# Patient Record
Sex: FEMALE | Race: WHITE | NOT HISPANIC OR LATINO | Employment: UNEMPLOYED | URBAN - METROPOLITAN AREA
[De-identification: names, ages, dates, MRNs, and addresses within clinical notes are randomized per-mention and may not be internally consistent; named-entity substitution may affect disease eponyms.]

---

## 2018-06-20 ENCOUNTER — OFFICE VISIT (OUTPATIENT)
Dept: OBSTETRICS AND GYNECOLOGY | Facility: CLINIC | Age: 63
End: 2018-06-20
Payer: COMMERCIAL

## 2018-06-20 VITALS — DIASTOLIC BLOOD PRESSURE: 68 MMHG | SYSTOLIC BLOOD PRESSURE: 116 MMHG | WEIGHT: 112 LBS

## 2018-06-20 DIAGNOSIS — Z12.31 SCREENING MAMMOGRAM, ENCOUNTER FOR: ICD-10-CM

## 2018-06-20 DIAGNOSIS — N95.0 POSTMENOPAUSAL BLEEDING: ICD-10-CM

## 2018-06-20 DIAGNOSIS — Z01.419 ENCOUNTER FOR GYNECOLOGICAL EXAMINATION (GENERAL) (ROUTINE) WITHOUT ABNORMAL FINDINGS: Primary | ICD-10-CM

## 2018-06-20 DIAGNOSIS — Z12.4 SCREENING FOR CERVICAL CANCER: ICD-10-CM

## 2018-06-20 DIAGNOSIS — D18.01 CAPILLARY HEMANGIOMA OF SKIN: ICD-10-CM

## 2018-06-20 PROCEDURE — 88175 CYTOPATH C/V AUTO FLUID REDO: CPT | Performed by: PATHOLOGY

## 2018-06-20 PROCEDURE — 88141 CYTOPATH C/V INTERPRET: CPT | Mod: ,,, | Performed by: PATHOLOGY

## 2018-06-20 PROCEDURE — 99386 PREV VISIT NEW AGE 40-64: CPT | Mod: S$GLB,,, | Performed by: OBSTETRICS & GYNECOLOGY

## 2018-06-20 PROCEDURE — 99999 PR PBB SHADOW E&M-NEW PATIENT-LVL II: CPT | Mod: PBBFAC,,, | Performed by: OBSTETRICS & GYNECOLOGY

## 2018-06-20 RX ORDER — CYCLOSPORINE 0.5 MG/ML
EMULSION OPHTHALMIC
COMMUNITY
Start: 2018-05-11

## 2018-06-20 RX ORDER — METOPROLOL TARTRATE 50 MG/1
TABLET ORAL
COMMUNITY
Start: 2018-05-31 | End: 2019-02-28

## 2018-06-20 NOTE — PROGRESS NOTES
Subjective:       Patient ID: Sophy Fajardo is a 62 y.o. female.    Chief Complaint:  Well Woman      History of Present Illness  HPI  Annual Exam-Postmenopausal  Patient presents for annual exam. The patient c/o vaginal bleeding a few days ago. The patient is sexually active--occas vaginal burning with intercourse; uses lubricant; libido ok;  doing well since prostate cancer (robotic prostatectomy). GYN screening history: last pap: was normal and patient does not recall when last pap was and last mammogram: approximate date 2018 and was normal. The patient has never been taking hormone replacement therapy.  Patient reports post-menopausal vaginal bleeding--noticed after bathing--noticed on mat on floor; had noticed one other time after intercourse ; . The patient wears seatbelts: yes. The patient participates in regular exercise: yes--walks every other day for 30 min; . Has the patient ever been transfused or tattooed?: no. The patient reports that there is not domestic violence in her life.    Caregiver for 2 sons with disabilities       GYN & OB History  Patient's last menstrual period was 2008 (approximate).   Date of Last Pap: No result found    OB History    Para Term  AB Living   5 5       5   SAB TAB Ectopic Multiple Live Births           5      # Outcome Date GA Lbr Martin/2nd Weight Sex Delivery Anes PTL Lv   5 Para      Vag-Spont   ALIX   4 Para      Vag-Spont   ALIX   3 Para      Vag-Spont   ALIX   2 Para      Vag-Spont   ALIX   1 Para      Vag-Spont   ALIX          Review of Systems  Review of Systems   Constitutional: Negative for activity change, appetite change, chills, diaphoresis, fatigue, fever and unexpected weight change.   HENT: Negative for mouth sores and tinnitus.    Eyes: Negative for discharge and visual disturbance.   Respiratory: Negative for cough, shortness of breath and wheezing.    Cardiovascular: Negative for chest pain, palpitations and leg swelling.    Gastrointestinal: Negative for abdominal pain, bloating, blood in stool, constipation, diarrhea, nausea and vomiting.   Endocrine: Negative for diabetes, hair loss, hot flashes, hyperthyroidism and hypothyroidism.   Genitourinary: Positive for dyspareunia. Negative for decreased libido, dysuria, flank pain, frequency, genital sores, hematuria, menorrhagia, menstrual problem, pelvic pain, urgency, vaginal bleeding, vaginal discharge, vaginal pain, dysmenorrhea, urinary incontinence, postcoital bleeding, postmenopausal bleeding and vaginal odor.   Musculoskeletal: Negative for back pain and myalgias.   Skin:  Negative for rash, no acne and hair changes.   Neurological: Negative for seizures, syncope, numbness and headaches.   Hematological: Negative for adenopathy. Does not bruise/bleed easily.   Psychiatric/Behavioral: Negative for depression and sleep disturbance. The patient is not nervous/anxious.    Breast: Negative for breast mass, breast pain, nipple discharge and skin changes          Objective:    Physical Exam:   Constitutional: She appears well-developed.     Eyes: Conjunctivae and EOM are normal. Pupils are equal, round, and reactive to light.    Neck: Normal range of motion. Neck supple.     Pulmonary/Chest: Effort normal. Right breast exhibits no mass, no nipple discharge, no skin change and no tenderness. Left breast exhibits no mass, no nipple discharge, no skin change and no tenderness. Breasts are symmetrical.        Abdominal: Soft.     Genitourinary: Rectum normal, vagina normal and uterus normal.       Pelvic exam was performed with patient supine. Cervix is normal. Right adnexum displays no mass and no tenderness. Left adnexum displays no mass and no tenderness. No erythema, bleeding, rectocele, cystocele or unspecified prolapse of vaginal walls in the vagina. No vaginal discharge found. Labial bartholins normal.  Genitourinary Comments: atrophic        Uterus Size: 6 cm   Musculoskeletal:  Normal range of motion.       Neurological: She is alert.    Skin: Skin is warm.    Psychiatric: She has a normal mood and affect.          Assessment:     Encounter Diagnoses   Name Primary?    Postmenopausal bleeding     Encounter for gynecological examination (general) (routine) without abnormal findings Yes    Screening for cervical cancer     Screening mammogram, encounter for     Capillary hemangioma of skin                Plan:      Continue annual well woman exam.  Pap today; Reviewed updated recommendations for pap smears (every 3 years) in low risk patients.   Recommend annual pelvic exams.  Reviewed recommendations for annual CBE.  sono to eval uterine lining (postmenopausal bleeding) ; aware if lining >4mm, will need emb  Current on mammo  If sono/pap negative; suspect bleeding may have been from vulvar capillary  hemangioma

## 2018-06-21 ENCOUNTER — TELEPHONE (OUTPATIENT)
Dept: OBSTETRICS AND GYNECOLOGY | Facility: CLINIC | Age: 63
End: 2018-06-21

## 2018-06-27 ENCOUNTER — TELEPHONE (OUTPATIENT)
Dept: OBSTETRICS AND GYNECOLOGY | Facility: CLINIC | Age: 63
End: 2018-06-27

## 2018-06-27 NOTE — TELEPHONE ENCOUNTER
Previous message in her chart was done in error. She was scheduled for a repeat pap smear due to unsatisfactory results. Pt accepts appointment and verbalizes understanding. JAMEL

## 2018-06-27 NOTE — TELEPHONE ENCOUNTER
Pt has been made aware of prolactin results; she will fast the night before her next scheduled lab draw. Pt verbalizes understanding. JAMEL

## 2018-07-19 ENCOUNTER — OFFICE VISIT (OUTPATIENT)
Dept: OBSTETRICS AND GYNECOLOGY | Facility: CLINIC | Age: 63
End: 2018-07-19
Payer: COMMERCIAL

## 2018-07-19 VITALS
HEIGHT: 62 IN | WEIGHT: 112.44 LBS | BODY MASS INDEX: 20.69 KG/M2 | SYSTOLIC BLOOD PRESSURE: 132 MMHG | DIASTOLIC BLOOD PRESSURE: 86 MMHG

## 2018-07-19 DIAGNOSIS — R87.615 UNSATISFACTORY CERVICAL CYTOLOGY SMEAR: Primary | ICD-10-CM

## 2018-07-19 PROCEDURE — 99499 UNLISTED E&M SERVICE: CPT | Mod: S$GLB,,, | Performed by: OBSTETRICS & GYNECOLOGY

## 2018-07-19 PROCEDURE — 88175 CYTOPATH C/V AUTO FLUID REDO: CPT

## 2018-07-19 PROCEDURE — 99999 PR PBB SHADOW E&M-EST. PATIENT-LVL III: CPT | Mod: PBBFAC,,, | Performed by: OBSTETRICS & GYNECOLOGY

## 2018-07-19 RX ORDER — LEVOTHYROXINE SODIUM 150 UG/1
TABLET ORAL
COMMUNITY
Start: 2018-07-17 | End: 2019-02-28

## 2018-07-19 NOTE — PROGRESS NOTES
Subjective:       Patient ID: Sophy Fajarod is a 62 y.o. female.    Chief Complaint:  Abnormal Pap Smear (repeat 18 pap)      History of Present Illness  HPI  here for f/u pap   Previous pap 2018--unsatisfactory    GYN & OB History  Patient's last menstrual period was 2008 (approximate).   Date of Last Pap: 2018    OB History    Para Term  AB Living   5 5       5   SAB TAB Ectopic Multiple Live Births           5      # Outcome Date GA Lbr Martin/2nd Weight Sex Delivery Anes PTL Lv   5 Para      Vag-Spont   ALIX   4 Para      Vag-Spont   ALIX   3 Para      Vag-Spont   ALIX   2 Para      Vag-Spont   ALIX   1 Para      Vag-Spont   ALIX          Review of Systems  Review of Systems   All other systems reviewed and are negative.          Objective:    Physical Exam:   Constitutional: She appears well-developed.     Eyes: Conjunctivae and EOM are normal. Pupils are equal, round, and reactive to light.    Neck: Normal range of motion. Neck supple.     Pulmonary/Chest: Effort normal. Right breast exhibits no mass, no nipple discharge, no skin change, no tenderness and presence. Left breast exhibits no mass, no nipple discharge, no skin change, no tenderness and presence. Breasts are symmetrical.        Abdominal: Soft.     Genitourinary: Vagina normal and uterus normal. Pelvic exam was performed with patient supine.   Genitourinary Comments: atrophic           Musculoskeletal: Normal range of motion.       Neurological: She is alert.    Skin: Skin is warm.    Psychiatric: She has a normal mood and affect.          Assessment:      No diagnosis found.           Plan:      Continue annual well woman exam.

## 2019-02-28 ENCOUNTER — OFFICE VISIT (OUTPATIENT)
Dept: OBSTETRICS AND GYNECOLOGY | Facility: CLINIC | Age: 64
End: 2019-02-28
Payer: COMMERCIAL

## 2019-02-28 DIAGNOSIS — N95.0 POSTMENOPAUSAL BLEEDING: Primary | ICD-10-CM

## 2019-02-28 PROCEDURE — 99999 PR PBB SHADOW E&M-EST. PATIENT-LVL I: CPT | Mod: PBBFAC,,, | Performed by: OBSTETRICS & GYNECOLOGY

## 2019-02-28 PROCEDURE — 99213 PR OFFICE/OUTPT VISIT, EST, LEVL III, 20-29 MIN: ICD-10-PCS | Mod: S$GLB,,, | Performed by: OBSTETRICS & GYNECOLOGY

## 2019-02-28 PROCEDURE — 99213 OFFICE O/P EST LOW 20 MIN: CPT | Mod: S$GLB,,, | Performed by: OBSTETRICS & GYNECOLOGY

## 2019-02-28 PROCEDURE — 99999 PR PBB SHADOW E&M-EST. PATIENT-LVL I: ICD-10-PCS | Mod: PBBFAC,,, | Performed by: OBSTETRICS & GYNECOLOGY

## 2019-02-28 RX ORDER — METOPROLOL TARTRATE 50 MG/1
TABLET ORAL
COMMUNITY

## 2019-02-28 RX ORDER — LEVOTHYROXINE SODIUM 150 UG/1
TABLET ORAL
COMMUNITY
End: 2019-02-28

## 2019-02-28 NOTE — PROGRESS NOTES
Subjective:       Patient ID: Sophy Fajardo is a 63 y.o. female.    Chief Complaint:  Post menopausal bleeding      History of Present Illness  HPI  here for problem   Reports having another episode of vaginal bleeding that lasted 1 day    Hx of similar problem 2018; pap negative;   Previously aware of need for emb; pt elected to do pelvic sono --but canceled appt    Questions wether bleeding could be from fibroids    GYN & OB History  Patient's last menstrual period was 2008 (approximate).   Date of Last Pap: 2018    OB History    Para Term  AB Living   5 5       5   SAB TAB Ectopic Multiple Live Births           5      # Outcome Date GA Lbr Martin/2nd Weight Sex Delivery Anes PTL Lv   5 Para      Vag-Spont   ALIX   4 Para      Vag-Spont   ALIX   3 Para      Vag-Spont   ALIX   2 Para      Vag-Spont   ALIX   1 Para      Vag-Spont   ALIX          Review of Systems  Review of Systems   Genitourinary: Positive for postmenopausal bleeding.   All other systems reviewed and are negative.          Objective:      Physical Exam:   Constitutional: She appears well-developed.     Eyes: Conjunctivae and EOM are normal. Pupils are equal, round, and reactive to light.    Neck: Normal range of motion. Neck supple.     Pulmonary/Chest: Effort normal.        Abdominal: Soft.             Musculoskeletal: Normal range of motion.       Neurological: She is alert.    Skin: Skin is warm.    Psychiatric: She has a normal mood and affect.           Assessment:        1. Postmenopausal bleeding               Plan:      Emphasized need for emb  Declines emb today  Pt prefers pelvic sono  Pt aware if endometrial lining >4mm; needs emb or d&c  Pt voiced understanding

## 2019-03-06 ENCOUNTER — OFFICE VISIT (OUTPATIENT)
Dept: OBSTETRICS AND GYNECOLOGY | Facility: CLINIC | Age: 64
End: 2019-03-06
Payer: COMMERCIAL

## 2019-03-06 ENCOUNTER — PROCEDURE VISIT (OUTPATIENT)
Dept: OBSTETRICS AND GYNECOLOGY | Facility: CLINIC | Age: 64
End: 2019-03-06
Payer: COMMERCIAL

## 2019-03-06 VITALS
DIASTOLIC BLOOD PRESSURE: 84 MMHG | BODY MASS INDEX: 20.48 KG/M2 | HEIGHT: 62 IN | SYSTOLIC BLOOD PRESSURE: 152 MMHG | WEIGHT: 111.31 LBS

## 2019-03-06 DIAGNOSIS — N95.0 POSTMENOPAUSAL BLEEDING: ICD-10-CM

## 2019-03-06 DIAGNOSIS — N95.0 POSTMENOPAUSAL BLEEDING: Primary | ICD-10-CM

## 2019-03-06 PROCEDURE — 76856 PR  ECHO,PELVIC (NONOBSTETRIC): ICD-10-PCS | Mod: S$GLB,,, | Performed by: OBSTETRICS & GYNECOLOGY

## 2019-03-06 PROCEDURE — 99213 PR OFFICE/OUTPT VISIT, EST, LEVL III, 20-29 MIN: ICD-10-PCS | Mod: 25,S$GLB,, | Performed by: OBSTETRICS & GYNECOLOGY

## 2019-03-06 PROCEDURE — 99999 PR PBB SHADOW E&M-EST. PATIENT-LVL III: CPT | Mod: PBBFAC,,, | Performed by: OBSTETRICS & GYNECOLOGY

## 2019-03-06 PROCEDURE — 76856 US EXAM PELVIC COMPLETE: CPT | Mod: S$GLB,,, | Performed by: OBSTETRICS & GYNECOLOGY

## 2019-03-06 PROCEDURE — 99213 OFFICE O/P EST LOW 20 MIN: CPT | Mod: 25,S$GLB,, | Performed by: OBSTETRICS & GYNECOLOGY

## 2019-03-06 PROCEDURE — 99999 PR PBB SHADOW E&M-EST. PATIENT-LVL III: ICD-10-PCS | Mod: PBBFAC,,, | Performed by: OBSTETRICS & GYNECOLOGY

## 2019-03-06 NOTE — PROGRESS NOTES
Subjective:       Patient ID: Sophy Fajardo is a 63 y.o. female.    Chief Complaint:  Postmenopausal bleeding      History of Present Illness  HPI  here for follow up   Had sono for postmenopausal bleeding    sono findings reviewed--ut 6x4x7 with lining 4 mm; 2 intramural fibroids  Rt/lt ov not visualized;         GYN & OB History  Patient's last menstrual period was 2008 (approximate).   Date of Last Pap: 2018    OB History    Para Term  AB Living   5 5       5   SAB TAB Ectopic Multiple Live Births           5      # Outcome Date GA Lbr Martin/2nd Weight Sex Delivery Anes PTL Lv   5 Para      Vag-Spont   ALIX   4 Para      Vag-Spont   ALIX   3 Para      Vag-Spont   ALIX   2 Para      Vag-Spont   ALIX   1 Para      Vag-Spont   ALIX          Review of Systems  Review of Systems   Genitourinary: Positive for postmenopausal bleeding.   All other systems reviewed and are negative.          Objective:      Physical Exam:   Constitutional: She appears well-developed.     Eyes: Conjunctivae and EOM are normal. Pupils are equal, round, and reactive to light.    Neck: Normal range of motion. Neck supple.     Pulmonary/Chest: Effort normal.        Abdominal: Soft.             Musculoskeletal: Normal range of motion.       Neurological: She is alert.    Skin: Skin is warm.    Psychiatric: She has a normal mood and affect.           Assessment:     Encounter Diagnosis   Name Primary?    Postmenopausal bleeding Yes             Plan:   sono report reviewed with patient  Stress to patient the importance of sampling tissue to r/o diagnosis of endometrial cancer  Pt offered office emb vs hysteroscopy with d&c/myosure  Pt agrees to proceed with emb  Reviewed need to premedicate with nsaid prior to appt

## 2019-03-18 ENCOUNTER — PROCEDURE VISIT (OUTPATIENT)
Dept: OBSTETRICS AND GYNECOLOGY | Facility: CLINIC | Age: 64
End: 2019-03-18
Payer: COMMERCIAL

## 2019-03-18 VITALS
WEIGHT: 110.25 LBS | BODY MASS INDEX: 20.29 KG/M2 | SYSTOLIC BLOOD PRESSURE: 180 MMHG | DIASTOLIC BLOOD PRESSURE: 102 MMHG | HEIGHT: 62 IN

## 2019-03-18 DIAGNOSIS — N95.0 POSTMENOPAUSAL BLEEDING: Primary | ICD-10-CM

## 2019-03-18 LAB
B-HCG UR QL: NEGATIVE
CTP QC/QA: YES

## 2019-03-18 PROCEDURE — 88305 TISSUE EXAM BY PATHOLOGIST: CPT | Mod: 26,,, | Performed by: PATHOLOGY

## 2019-03-18 PROCEDURE — 81025 URINE PREGNANCY TEST: CPT | Mod: S$GLB,,, | Performed by: OBSTETRICS & GYNECOLOGY

## 2019-03-18 PROCEDURE — 81025 POCT URINE PREGNANCY: ICD-10-PCS | Mod: S$GLB,,, | Performed by: OBSTETRICS & GYNECOLOGY

## 2019-03-18 PROCEDURE — 88305 TISSUE EXAM BY PATHOLOGIST: CPT | Performed by: PATHOLOGY

## 2019-03-18 PROCEDURE — 88305 TISSUE SPECIMEN TO PATHOLOGY, OBSTETRICS/GYNECOLOGY: ICD-10-PCS | Mod: 26,,, | Performed by: PATHOLOGY

## 2019-03-18 NOTE — PROCEDURES
Procedures     CC: ENDOMETRIAL BIOPSPY    Sophy Fajardo is a 63 y.o. female  presents for an endometrial biopsy secondary to postmenopausal bleeding and thickened endometrial lining on sono.  UPT is Negative.      PRE-ENDOMETRIAL BIOPSY COUNSELING:    The patient was informed of the risk of bleeding, infection, uterine perforation and pain and that the test will rule-out endometrial cancer with accuracy greater than 95%.  She was counseled on the alternatives to endometrial biopsy and agrees to proceed.      TIME OUT PERFORMED.    The cervix was visualized with a speculum.  A single tooth tenaculum was placed on the anterior lip prior to the biopsy.      A sterile endometrial pipelle was passed without difficulty to a depth of 9 cm.    Endometrial tissue was obtained.      The specimen was placed in formalyn and sent to Pathology of histology evaluation.    The patient tolerated the procedure well.      ASSESSMENT AND PLAN  1. Postmenopausal bleeding  Tissue Specimen To Pathology, Obstetrics/Gynecology       POST ENDOMETRIAL BIOPSY COUNSELING:  Manage post biopsy cramping with NSAIDs or Tylenol.  Expect spotting or light bleeding for a few days.  Report bleeding heavier than a period, fever > 101.0 F, worsening pain or a foul smelling vaginal discharge.      Counseling lasted approximately 15 minutes and all her questions were answered.      FOLLOW-UP:  Pending biopsy

## 2019-04-03 ENCOUNTER — TELEPHONE (OUTPATIENT)
Dept: OBSTETRICS AND GYNECOLOGY | Facility: CLINIC | Age: 64
End: 2019-04-03

## 2019-04-03 NOTE — TELEPHONE ENCOUNTER
Spoke to the pt. And advised her that her emb was negative for hyperplasia or cancer; please let us know if has further bleeding; would need hysteroscopy. Pt. Acknowledged understanding. ko Diaz

## 2019-07-10 ENCOUNTER — OFFICE VISIT (OUTPATIENT)
Dept: OBSTETRICS AND GYNECOLOGY | Facility: CLINIC | Age: 64
End: 2019-07-10
Payer: COMMERCIAL

## 2019-07-10 VITALS
WEIGHT: 110.25 LBS | BODY MASS INDEX: 20.29 KG/M2 | DIASTOLIC BLOOD PRESSURE: 66 MMHG | SYSTOLIC BLOOD PRESSURE: 118 MMHG | HEIGHT: 62 IN

## 2019-07-10 DIAGNOSIS — Z01.419 ENCOUNTER FOR GYNECOLOGICAL EXAMINATION (GENERAL) (ROUTINE) WITHOUT ABNORMAL FINDINGS: Primary | ICD-10-CM

## 2019-07-10 DIAGNOSIS — Z12.31 SCREENING MAMMOGRAM, ENCOUNTER FOR: ICD-10-CM

## 2019-07-10 DIAGNOSIS — Z12.4 SCREENING FOR CERVICAL CANCER: ICD-10-CM

## 2019-07-10 PROCEDURE — 99999 PR PBB SHADOW E&M-EST. PATIENT-LVL II: ICD-10-PCS | Mod: PBBFAC,,, | Performed by: OBSTETRICS & GYNECOLOGY

## 2019-07-10 PROCEDURE — 99999 PR PBB SHADOW E&M-EST. PATIENT-LVL II: CPT | Mod: PBBFAC,,, | Performed by: OBSTETRICS & GYNECOLOGY

## 2019-07-10 PROCEDURE — 99396 PREV VISIT EST AGE 40-64: CPT | Mod: S$GLB,,, | Performed by: OBSTETRICS & GYNECOLOGY

## 2019-07-10 PROCEDURE — 99396 PR PREVENTIVE VISIT,EST,40-64: ICD-10-PCS | Mod: S$GLB,,, | Performed by: OBSTETRICS & GYNECOLOGY

## 2019-07-10 RX ORDER — LEVOTHYROXINE SODIUM 125 UG/1
TABLET ORAL
COMMUNITY

## 2019-07-10 NOTE — PROGRESS NOTES
Subjective:       Patient ID: Sophy Fajardo is a 63 y.o. female.    Chief Complaint:  Well Woman      History of Present Illness  HPI  Annual Exam-Postmenopausal  Patient presents for annual exam. The patient has no complaints today. The patient is sexually active--occas vaginal dryness, +use of lubricant. GYN screening history: last pap: approximate date 2018 and was normal. Last mammogram--2019 wnl; The patient has never been taking hormone replacement therapy. Patient denies post-menopausal vaginal bleeding. The patient wears seatbelts: yes. The patient participates in regular exercise: yes--walks/dancing--daily--30min; . Has the patient ever been transfused or tattooed?: no. The patient reports that there is not domestic violence in her life.    Denies hot flushes, night sweat    No problems sleeping    taklng vit d but not calcium           GYN & OB History  Patient's last menstrual period was 2008 (approximate).   Date of Last Pap: 2018    OB History    Para Term  AB Living   5 5       5   SAB TAB Ectopic Multiple Live Births           5      # Outcome Date GA Lbr Martin/2nd Weight Sex Delivery Anes PTL Lv   5 Para      Vag-Spont   ALIX   4 Para      Vag-Spont   ALIX   3 Para      Vag-Spont   ALIX   2 Para      Vag-Spont   ALIX   1 Para      Vag-Spont   ALIX       Review of Systems  Review of Systems   Genitourinary: Positive for dyspareunia.   All other systems reviewed and are negative.          Objective:      Physical Exam:   Constitutional: She appears well-developed.     Eyes: Pupils are equal, round, and reactive to light. Conjunctivae and EOM are normal.    Neck: Normal range of motion. Neck supple.     Pulmonary/Chest: Effort normal. Right breast exhibits no mass, no nipple discharge, no skin change and no tenderness. Left breast exhibits no mass, no nipple discharge, no skin change and no tenderness. Breasts are symmetrical.        Abdominal: Soft.     Genitourinary: Rectum normal,  vagina normal and uterus normal. Pelvic exam was performed with patient supine. Cervix is normal. Right adnexum displays no tenderness. Left adnexum displays no tenderness. No erythema, bleeding, rectocele, cystocele or unspecified prolapse of vaginal walls in the vagina. No vaginal discharge found. Labial bartholins normal.Cervix exhibits no motion tenderness and no friability. Also,  no recent pap smear    Genitourinary Comments: atrophic        Uterus Size: 6 cm   Musculoskeletal: Normal range of motion.       Neurological: She is alert.    Skin: Skin is warm.    Psychiatric: She has a normal mood and affect.           Assessment:         Encounter Diagnoses   Name Primary?    Encounter for gynecological examination (general) (routine) without abnormal findings Yes    Screening for cervical cancer     Screening mammogram, encounter for              Plan:      Continue annual well woman exam.  Pap 2018, due in 2021; Reviewed updated recommendations for pap smears (every 3 years) in low risk patients.   Recommend annual pelvic exams.  Reviewed recommendations for annual CBE.  mammo current, continue yearly until age 76  Osteoporosis prevention; 1200mg calcium/d with source of vitamin d  Continue diet, exercise, weight loss

## 2020-01-06 ENCOUNTER — OFFICE VISIT (OUTPATIENT)
Dept: OBSTETRICS AND GYNECOLOGY | Facility: CLINIC | Age: 65
End: 2020-01-06
Payer: COMMERCIAL

## 2020-01-06 VITALS
SYSTOLIC BLOOD PRESSURE: 138 MMHG | WEIGHT: 110.25 LBS | HEIGHT: 62 IN | BODY MASS INDEX: 20.29 KG/M2 | DIASTOLIC BLOOD PRESSURE: 72 MMHG

## 2020-01-06 DIAGNOSIS — N95.0 POSTMENOPAUSAL BLEEDING: Primary | ICD-10-CM

## 2020-01-06 PROCEDURE — 99213 OFFICE O/P EST LOW 20 MIN: CPT | Mod: S$GLB,,, | Performed by: OBSTETRICS & GYNECOLOGY

## 2020-01-06 PROCEDURE — 99999 PR PBB SHADOW E&M-EST. PATIENT-LVL III: ICD-10-PCS | Mod: PBBFAC,,, | Performed by: OBSTETRICS & GYNECOLOGY

## 2020-01-06 PROCEDURE — 99999 PR PBB SHADOW E&M-EST. PATIENT-LVL III: CPT | Mod: PBBFAC,,, | Performed by: OBSTETRICS & GYNECOLOGY

## 2020-01-06 PROCEDURE — 99213 PR OFFICE/OUTPT VISIT, EST, LEVL III, 20-29 MIN: ICD-10-PCS | Mod: S$GLB,,, | Performed by: OBSTETRICS & GYNECOLOGY

## 2020-01-06 RX ORDER — METOPROLOL TARTRATE 50 MG/1
TABLET ORAL
COMMUNITY
End: 2020-01-06 | Stop reason: SDUPTHER

## 2020-01-06 NOTE — PROGRESS NOTES
Subjective:       Patient ID: Sophy Fajardo is a 64 y.o. female.    Chief Complaint:  Follow-up (bleeding, not bleeding anymire)      History of Present Illness  HPI  here for problem   Reports 1 episode of vaginal bleeding; noticed scant amount on panti liner for a few hours on one day in October    Has not had any further bleeding  Denies uterine cramping    Pap 2019 negative  sono 3/2019 and neg emb 3/2019 for same    GYN & OB History  Patient's last menstrual period was 2008 (approximate).   Date of Last Pap: 2018    OB History    Para Term  AB Living   5 5       5   SAB TAB Ectopic Multiple Live Births           5      # Outcome Date GA Lbr Martin/2nd Weight Sex Delivery Anes PTL Lv   5 Para      Vag-Spont   ALIX   4 Para      Vag-Spont   ALIX   3 Para      Vag-Spont   ALIX   2 Para      Vag-Spont   ALIX   1 Para      Vag-Spont   ALIX       Review of Systems  Review of Systems   Genitourinary: Positive for postmenopausal bleeding.   All other systems reviewed and are negative.          Objective:      Physical Exam:   Constitutional: She appears well-developed.     Eyes: Pupils are equal, round, and reactive to light. Conjunctivae and EOM are normal.    Neck: Normal range of motion. Neck supple.     Pulmonary/Chest: Effort normal.        Abdominal: Soft.             Musculoskeletal: Normal range of motion.       Neurological: She is alert.    Skin: Skin is warm.    Psychiatric: She has a normal mood and affect.           Assessment:        1. Postmenopausal bleeding               Plan:      Reviewed options sono, emb, hysteroscopy, hysterectomy  Pt prefers to proceed with ultrasound; aware if lining >4mm would recommend proceed with hysteroscopy  Info given on hysteroscopy  Pt agreeable to plan

## 2020-01-06 NOTE — PATIENT INSTRUCTIONS
Hysteroscopy    Hysteroscopy is a procedure that is done to see inside your uterus. It can help find the cause of problems in the uterus. This helps your health care provider decide on the best treatment. In some cases, it can be used to perform treatment. Hysteroscopy may be done in your health care provider's office or in the hospital.  Why might I need hysteroscopy?  Hysteroscopy may be done based on the results of other tests. It can help find the cause of problems. These can include:  · Unusually heavy or long menstrual periods  · Bleeding between periods  · Postmenopausal bleeding  · Trouble becoming pregnant (infertility) or carrying a pregnancy to term  · To locate an intrauterine device (IUD)  · To perform sterilization  What are the risks and complications of hysteroscopy?  Problems with the procedure are rare. But all procedures have risks. Risks of hysteroscopy include:  · Infection  · Bleeding  · Tearing of the uterine wall  · Damage to internal organs  · Scarring of the uterus  · Fluid overload  · Problems with anesthesia (the medication that prevents pain during the procedure)  How do I get ready for hysteroscopy?  · Tell your health care provider if you have any health problems. These include diabetes, heart disease, or bleeding problems.  · Tell your health care provider about all the medicines you take. This includes any over-the-counter medications, herbs, or supplements.  · You may be told not to use vaginal creams or medication. And you may be told not to have sex or douche.  · You may be told not to eat or drink the night before the procedure.  · You may be tested for pregnancy and infection.  · You may be asked to sign a consent form.  · You may be given a pain reliever to take an hour before the procedure. This helps relieve cramping that may occur.  What happens during a hysteroscopy?  · Youll lie on an exam table with your feet in stirrups.  · You may be given general anesthesia or  medicines to help you relax or sleep. In some cases, an IV line will be put into a vein in your arm or hand. This line is then used to give fluids and medicines.  · A tool called a speculum is inserted into the vagina to hold it open. A tool called a dilator may be used to widen the cervix.  · Numbing medicine may be applied to the cervix.  · The hysteroscope (a long, thin lighted tube) is inserted through the vagina and into the uterus. It is used to see inside the uterus. Images of the uterus are viewed on a monitor.  · A gas or fluid may be injected into the uterus to expand it.  · Other tools may be put through the hysteroscope. These are used to take tissue samples, remove growths, or place implants for the purpose of sterilization.  What happens after hysteroscopy?  · You may have cramps and bleeding for 24 hours after the procedure. This is normal. Use pads instead of tampons.  · Do not douche or use tampons until your health care provider says its OK.  · Do not use any vaginal medicines until you are told its OK.  · Ask your health care provider when its OK to have sex again.  When should I call my health care provider?  Call your health care provider if you have:  · Heavy bleeding (more than 1 pad an hour for 2 or more hours)  · A fever over 100.4°F (38.0°C)  · Increasing abdominal pain or tenderness  · Foul-smelling discharge   Follow-up care  Schedule a follow-up visit with your health care provider. Based on the results of your test, you may need more treatment. Be sure to follow instructions and keep your appointments.  Date Last Reviewed: 5/12/2015  © 8352-1669 [employer]. [address] [place] [address], [place] [address]. All rights reserved. This information is not intended as a substitute for professional medical care. Always follow your healthcare professional's instructions.

## 2020-02-11 ENCOUNTER — PROCEDURE VISIT (OUTPATIENT)
Dept: OBSTETRICS AND GYNECOLOGY | Facility: CLINIC | Age: 65
End: 2020-02-11
Payer: COMMERCIAL

## 2020-02-11 DIAGNOSIS — N95.0 POSTMENOPAUSAL BLEEDING: ICD-10-CM

## 2020-02-11 PROCEDURE — 76856 US EXAM PELVIC COMPLETE: CPT | Mod: S$GLB,,, | Performed by: OBSTETRICS & GYNECOLOGY

## 2020-02-11 PROCEDURE — 76856 PR  ECHO,PELVIC (NONOBSTETRIC): ICD-10-PCS | Mod: S$GLB,,, | Performed by: OBSTETRICS & GYNECOLOGY

## 2020-02-12 ENCOUNTER — TELEPHONE (OUTPATIENT)
Dept: OBSTETRICS AND GYNECOLOGY | Facility: CLINIC | Age: 65
End: 2020-02-12

## 2020-02-12 NOTE — TELEPHONE ENCOUNTER
Spoke to the pt and advised her that the pelvic sono shows that the lining is 8 mm (should be <4mm); recommend outpt surgery of hysteroscopy with d&c; (pamphlets previously given) Scheduled pt for surgery consults. ko Lopez

## 2020-02-20 ENCOUNTER — OFFICE VISIT (OUTPATIENT)
Dept: OBSTETRICS AND GYNECOLOGY | Facility: CLINIC | Age: 65
End: 2020-02-20
Payer: COMMERCIAL

## 2020-02-20 VITALS
HEIGHT: 62 IN | BODY MASS INDEX: 20.29 KG/M2 | SYSTOLIC BLOOD PRESSURE: 108 MMHG | WEIGHT: 110.25 LBS | DIASTOLIC BLOOD PRESSURE: 66 MMHG

## 2020-02-20 DIAGNOSIS — N95.0 POSTMENOPAUSAL BLEEDING: Primary | ICD-10-CM

## 2020-02-20 PROCEDURE — 99213 OFFICE O/P EST LOW 20 MIN: CPT | Mod: S$GLB,,, | Performed by: OBSTETRICS & GYNECOLOGY

## 2020-02-20 PROCEDURE — 99999 PR PBB SHADOW E&M-EST. PATIENT-LVL III: CPT | Mod: PBBFAC,,, | Performed by: OBSTETRICS & GYNECOLOGY

## 2020-02-20 PROCEDURE — 99213 PR OFFICE/OUTPT VISIT, EST, LEVL III, 20-29 MIN: ICD-10-PCS | Mod: S$GLB,,, | Performed by: OBSTETRICS & GYNECOLOGY

## 2020-02-20 PROCEDURE — 99999 PR PBB SHADOW E&M-EST. PATIENT-LVL III: ICD-10-PCS | Mod: PBBFAC,,, | Performed by: OBSTETRICS & GYNECOLOGY

## 2020-02-20 NOTE — PATIENT INSTRUCTIONS
Discharge Instructions for Laparoscopic Hysterectomy  You had a procedure called laparoscopic hysterectomy. A surgeon removed your uterus using instruments inserted through small incisions in your abdomen. These incisions may be tender or sore. You may also have pain in your upper back or shoulders. This is from the gas used to enlarge your abdomen to allow your doctor to see inside your pelvis and perform the procedure. This pain usually goes away in a day or two. It usually takes from 1 to 4 weeks to recover from laparoscopic hysterectomy. Remember, though, that recovery time varies from woman to woman. Here's what you can do to speed your recovery following surgery.  Home care   · Continue the coughing and deep breathing exercises that you learned in the hospital.  · Take your medications exactly as directed by your doctor.  · Avoid constipation.  ¨ Eat fruits, vegetables, and whole grains.  ¨ Drink 6 to 8 glasses of water a day, unless told to do otherwise.  ¨ Use a laxative or a mild stool softener if your doctor says it's OK.  · Shower as usual. Wash your incisions with mild soap and water. Pat dry.  · Don't use oils, powders, or lotions on your incisions.  · Don't put anything in your vagina until your doctor says it's safe to do so. Don't use tampons or douches. Don't have sex.  · If you had both ovaries removed, report hot flashes, mood swings, and irritability to your doctor. There may be medications that can help you.  Activity  · Ask your doctor when you can start driving again. It's usually okay to drive as soon as you are free of pain and able to move comfortably from side to side. Don't drive while you are still taking opioid pain medications.  · Ask others to help with chores and errands while you recover.  · Dont lift anything heavier than 10 pounds for 6 weeks.  · Dont vacuum or do other strenuous activities until the doctor says it's OK.  · Walk as often as you feel able.  · Don't drive for a  few days after the surgery. You may drive as soon as you are able to move comfortably from side to side and when you are no longer taking narcotics.  · Climb stairs slowly and pause after every few steps.  Follow-up care  Make a follow-up appointment as directed by our staff.     When to call your doctor  Call your doctor right away if you have any of the following:  · Fever above 100.4°F (38°C) or chills  · Bright red vaginal bleeding or vaginal bleeding that soaks more than one sanitary pad per hour  · A foul smelling discharge from the vagina  · Trouble urinating or burning when you urinate  · Severe pain or bloating in your abdomen  · Redness, swelling, or drainage at your incision sites  · Shortness of breath or chest pain  · Nausea and vomiting   Date Last Reviewed: 5/19/2015 © 2000-2017 [employer]. [address] [place] [address]. All rights reserved. This information is not intended as a substitute for professional medical care. Always follow your healthcare professional's instructions.        After Laparoscopic Hysterectomy for Uterine Prolapse  Uterine prolapse occurs when the uterus drops into the vagina. In severe cases, the uterus may stick out from the vagina. The goal of surgery is to repair the problem and ease your symptoms. You had a procedure called laparoscopic hysterectomy. A surgeon removed your uterus using instruments inserted through small incisions in your abdomen. These incisions may be tender or sore. You may also have pain in your upper back or shoulders. This is from the gas used to enlarge your abdomen so your doctor could see inside your pelvis and do the procedure. It usually takes about 1 to 4 weeks to recover from laparoscopic hysterectomy, but recovery time varies from woman to woman. Here's what you can do to speed your recovery.  Activity  · Ask your friends and family to help with chores and errands while you recover.  · Dont lift anything heavier  than 10 pounds for 6 weeks to avoid straining your incisions.  · Dont push a vacuum or do other strenuous housework until the doctor says its OK.  · Climb stairs slowly and pause after every few steps.  · Continue with the coughing and deep breathing exercises that you learned in the hospital.  · Dont drive for a few days after the surgery. You may drive as soon as you are able to move comfortably from side to side and when you are no longer taking narcotics.  · Walk as often as you feel able.  Other home care  · Take your medicines exactly as directed by your doctor.  · Avoid constipation.  ¨ Eat fruits, vegetables, and whole grains.  ¨ Drink 6 to 8 glasses of water every day, unless directed otherwise.  ¨ Use a laxative or a mild stool softener if your doctor says its OK.  · Shower as usual. Wash your incision with mild soap and water. Pat it dry. Dont use oils, powders, or lotions on your incision.  · Dont put anything in the vagina or use tampons or douches until your doctor says its safe to do so. Do not have sex until your doctor says it is OK.  · Report hot flashes, mood swings, and irritability to your doctor. There may be medicine that can help you.  Follow-up care  Make a follow-up appointment as directed by our staff.     When to call your doctor  Call your doctor right away if you have any of the following:  · Fever above 100.4°F (38.0°C) or chills  · Bright red vaginal bleeding or foul smelling discharge  · Vaginal bleeding that soaks more than one sanitary pad per hour  · Burning sensation when urinating or trouble urinating  · Severe abdominal pain or bloating  · Redness, swelling, or drainage at your incision site  · Shortness of breath or chest pain  · Nausea and vomiting   Date Last Reviewed: 5/21/2015  © 2737-7086 [employer]. [address] [place] [address], [place] [address]. All rights reserved. This information is not intended as a substitute for professional medical care. Always  follow your healthcare professional's instructions.        Discharge Instructions for Abdominal Hysterectomy  You had a procedure called abdominal hysterectomy, a surgery to remove your uterus. This can relieve such problems as severe pain and bleeding. It usually takes from 4 to 6 weeks to recover from abdominal hysterectomy. Remember, though, that recovery time varies from woman to woman.     When to call your doctor  Call your doctor right away if you have any of the following:  · Fever above 100.4°F (38°C)   · Chills  · Bright red vaginal bleeding or vaginal bleeding thatsoaks more than 1 pad per hour  · A smelly discharge from the vagina  · Trouble urinating or burning when you urinate  · Severe pain or bloating in your abdomen  · Redness, swelling, or drainage at your incision site  · Shortness of breath or chest pain  · Nausea and vomiting      Home care  These are suggestions for what to do once you are home:  · Dont drive until your doctor says it's OK. Dont drive while you are still taking opioid pain medications.  · Ask others to help with chores and errands while you recover.  · Dont lift anything heavier than 10 pounds for 6 weeks.  · Dont vacuum or do other strenuous activities until the doctor says its OK.  · Walk as often as you feel able.  · When you must climb stairs, go slowly and pause after every few steps.  · Continue the coughing and deep breathing exercises that you learned in the hospital.  · Avoid constipation:  ¨ Eat fruits, vegetables, and whole grains.  ¨ Drink 6 to 8 glasses of water a day, unless directed otherwise.  ¨ Use a laxative or a mild stool softener if your doctor says its OK.  · Shower as usual. Wash your incision with mild soap and water. Do not scrub the incision to clean it. Pat it dry.  · Dont use oils, powders, or lotions on your incision.  · Dont put anything in your vagina until your doctor says its safe to do so. Dont use tampons or douches. Dont have sex.  Do not do any of these things for 6 weeks.  · If you had both ovaries removed, report hot flashes, mood swings, and irritability to your doctor. There may be medications that can help you.  Follow-up  · Ask your doctor when you can return to work.  Date Last Reviewed: 5/19/2015  © 5545-8107 [employer]. [address] [place] [address], [place] [address]. All rights reserved. This information is not intended as a substitute for professional medical care. Always follow your healthcare professional's instructions.        Caring for Yourself After Hysterectomy     Staying active can help you feel better in mind and body.     After you recover from your hysterectomy, you may feel better than you have in a long time. An active, healthy lifestyle and regular medical care can help you continue to feel good.  Being intimate  After a hysterectomy, sex can be as pleasurable as it was before. Follow your surgeons instructions on when you can resume having intercourse. This is usually within 4 to 8 weeks after the procedure. Other types of sexual activity may be possible sooner. If you experience vaginal dryness during sex, use a lubricant. Be aware that a hysterectomy prevents pregnancy, but does not protect you against sexually transmitted diseases. If you have any concerns, discuss them with your partner and your healthcare provider.  Being aware of your emotions  After a hysterectomy, you may feel relieved to be free of symptoms. You may also feel sad about the changes in your body. If your ovaries were removed, you may go through some natural mood swings as your hormones adjust. Note how you are feeling from day to day. Talk to your healthcare provider if youre concerned about emotions you are feeling.  Ongoing healthcare  Regular physical exams help to ensure your general health and well-being:  · You will continue to need routine breast exams and pelvic exams. This includes Pap tests if you still have a cervix or  if you have a history of certain types of dysplasia or cervical cancer.   · If youre taking hormone therapy, you will need follow-up visits with your healthcare provider to fine-tune your dosage.  What to know about hormone therapy  Hormone therapy (HT) is medicine to replace the hormones made by your ovaries. It may be advised if your ovaries are removed and you have not yet gone through natural menopause. HT helps decrease hot flashes, vaginal dryness, and other symptoms of menopause. It may help reduce bone loss and lessen your risk of developing osteoporosis. But HT may also increase the risk of certain types of health problems in some women. Discuss the pros and cons of HT with your healthcare provider.   Date Last Reviewed: 3/1/2017  © 7722-8977 [employer]. [address] [place] [address], [place] [address]. All rights reserved. This information is not intended as a substitute for professional medical care. Always follow your healthcare professional's instructions.        Laparoscopic Hysterectomy: Your Experience  Talk to your healthcare provider about how to get ready for your surgery. Your healthcare providers will talk with you about what to expect as surgery draws near. Keep in mind that your experience may differ from that of other women you know.  Before the day of surgery  Your instructions may include the following:  · Stop taking certain medicines (including aspirin) for as many days before surgery as directed.  · If you smoke, stop as long as possible before surgery.  · Do not eat or drink anything after midnight the night before surgery. This includes chewing gum and mints.  · Arrange ahead of time for a ride home from the hospital or surgery center.  · If it is prescribed, take medicine to clean out your bowels the day before surgery. Your healthcare provider can give you more details about this.  On the day of surgery  Youll change into a gown. Youll then be prepped for your  procedure:  · The anesthesiologist or nurse anesthetist will discuss anesthesia with you and answer any questions you have.  · Some pubic hair may be shaved.  · An IV (intravenous) line will be put into your arm or hand. This line supplies you with medicines and fluids before, during, and after surgery.  · You may be given medicine that helps you relax. You will then be given general anesthesia to make you sleep and keep you free from pain during surgery.  Risks and complications of laparoscopic hysterectomy  Once you understand these risks, you will be asked to sign a consent form. Risks and possible complications include:  · Side effects from anesthesia  · Infection  · Bleeding, with a possible need for a transfusion  · Blood clots  · Damage to the bladder, bowel, ureters, or nearby nerves or blood vessels  · Formation of scar tissue that may cause pain or bowel obstruction in the future (more common with abdominal approach)  · Need for a second surgery   Date Last Reviewed: 3/1/2017  © 0572-8350 [employer]. [address] [place] [address]. All rights reserved. This information is not intended as a substitute for professional medical care. Always follow your healthcare professional's instructions.        Laparoscopic Hysterectomy: Your Home Recovery  When you leave the hospital, youll receive instructions on caring for yourself at home. Following these instructions helps ensure a faster recovery. It often takes about 1 week to 4 weeks to recover from laparoscopic hysterectomy. But recovery time varies from woman to woman.    Taking care of yourself  Follow these tips to make your recovery as safe and comfortable as possible:  · To avoid constipation, eat fruits, vegetables, and whole grains. Drink plenty of water. Your healthcare provider may suggest that you use a laxative or a mild stool softener.  · Ask your friends and family to help with chores and errands while you recover.  · Do  not lift anything over 10 pounds to avoid straining your incisions.  · Do not get your incisions wet until your healthcare provider says its OK to do so.  · Do not put anything in the vagina until your provider says its safe to do so. This includes using tampons and douches and having sexual intercourse.  · Schedule follow-up visits with your healthcare provider.  When to call your healthcare provider  Call your healthcare provider if you notice any of these:  · Chills or a fever of 100.4°F (38°C) or higher, or as advised  · Bright red vaginal bleeding or a smelly discharge  · Trouble urinating or burning during urination  · Severe abdominal pain or bloating  · A red, swollen, or draining smelly fluid from the incision sites  · Trouble breathing or fainting  · Swollen painful leg   Date Last Reviewed: 3/1/2017  © 0798-4300 [employer]. [address] [place] [address]. All rights reserved. This information is not intended as a substitute for professional medical care. Always follow your healthcare professional's instructions.        Laparoscopic Hysterectomy: Your Hospital Recovery     Walking soon after surgery helps prevent blood clots and breathing problems.     After your procedure, you may go home later the same day of your surgery. Or, you may stay in the hospital or surgery center for 1 to 2 days. Your healthcare providers will help with your recovery during this time. Before you go home, get answers to any questions you have.  Recovery right after surgery  After the procedure is finished, youll spend a few hours in the recovery area. You may feel drowsy or nauseated from the anesthesia. You may also have shoulder pain due to the gas used to expand your abdomen. Your throat may be sore and you may be hoarse due to the breathing tube used during surgery.  What to expect during your hospital stay  During your stay in the hospital or surgery center, the staff will look after you and  help you prepare to go home. During this time:  · You will be asked to walk around to help improve breathing and blood flow.  · You will be given medicine for any pain you have. Tell your nurse if you are uncomfortable.  · Incisions may leak a small amount of pinkish fluid for the first couple of days.  · Spotting from the vagina may occur. If so, ask the nurse for a sanitary pad.  · You will be shown how to clear your lungs to prevent infections.  · The catheter may be left in place for a short time after surgery. Youll be checked to make sure you can urinate without the catheter before you go home.  · Your meals can include solid food as soon as you feel ready.  Date Last Reviewed: 3/1/2017  © 2329-3584 [employer]. [address] [place] [address]. All rights reserved. This information is not intended as a substitute for professional medical care. Always follow your healthcare professional's instructions.        Problems That Hysterectomy Can Treat  Problems with any of the reproductive organs can disrupt your cycle, cause symptoms, or threaten your health. Some of the most common problems are shown below. A hysterectomy may also be done for other reasons.          Other problems hysterectomy can treat  Cervical dysplasia, chronic pelvic pain, abnormal uterine bleeding due to hyperplasia or adenomyosis.  Planning your treatment  After going over the results of your exam and any tests, you and your doctor will make a treatment plan. Options may include hysterectomy by itself or along with other treatments. As you create the plan, your doctor may discuss the following questions with you:  · How severe is your problem?  · Do you want to have children?  · Should the tubes or ovaries be removed too?  · Should the hysterectomy be done abdominally, laparoscopically, or vaginally?   Date Last Reviewed: 5/13/2015  © 9259-9733 [employer]. [address] [place] [address]. All  "rights reserved. This information is not intended as a substitute for professional medical care. Always follow your healthcare professional's instructions.        Recovering from Hysterectomy    Healing takes time. How much time depends on your health and the type of surgery you had. During that time, you can do a lot to make sure that you regain your health and energy.  You may be surprised at how soon you are urged to get up and walk. Walking lowers the risk of blood clots and breathing problems.   What to expect after surgery  For the first days after surgery, here is what you can expect:  · The abdominal incision may be closed with stitches or staples. It is sometimes covered with gauze.  · Pain can be relieved with medication prescribed by your doctor.  · Urination may be aided by a tube (catheter). It is put in your bladder during surgery. In most cases, it is taken out 1 day to 2 days after surgery.  · Vaginal bleeding is likely. You will need to use sanitary pads. Do not use tampons.  · Meals may be limited to liquids until your bowels are back to normal.  · Your lungs need to be kept clear of excess fluid. This prevents problems such as pneumonia. You will be shown how to clear your lungs.  Take care of yourself physically  To help your body heal, follow these tips:  · Take showers instead of baths.  · Do not use tampons or douches. They can cause the vagina to become infected.  · Do not have sex for as long as your doctor suggests.  · To avoid constipation, eat fruits, vegetables, and whole-grain foods. Drink at least 8 glasses of fluid each day.  · Increase activity gradually. Avoid tasks or movements that can strain your incision, such as lifting.  · Tell family and friends how they can help.  Take care of yourself emotionally  Having a hysterectomy may affect your emotions. You may be relieved to no longer have symptoms. But you may feel "down" about the changes in your body. You may also have mood " swings if your ovaries were removed and you hadn't yet reached menopause. To feel better, take any medications prescribed by your doctor. Also be sure to tell your doctor how you feel.  Date Last Reviewed: 5/13/2015  © 3047-2823 [employer]. [address] [place] [address]. All rights reserved. This information is not intended as a substitute for professional medical care. Always follow your healthcare professional's instructions.        Thinking About Hysterectomy    Before suggesting a hysterectomy, your doctor will evaluate your health problem. You and your doctor will go over the results of your exams and tests. Together, you can discuss your options and make a treatment plan.  Planning your treatment  Options for treating your problem may include medicine, nonsurgical procedures, hysterectomy, or a combination of treatments. While you are considering your options, think about these questions:  · What other treatments are available? Are there medicines or other types of surgery that are likely to relieve your symptoms?  · How severe is your problem? Is your health problem getting in the way of your daily life? Is the problem getting worse? If the answer to these questions is no, a hysterectomy may not be needed.  · Do you want to have children? If you are in your childbearing years and wish to have children, take time to explore options that may help you avoid a hysterectomy.  · Are you at risk of ovarian cancer? If youre at increased risk of ovarian cancer, your doctor may suggest removing the ovaries and fallopian tubes along with the uterus.  Date Last Reviewed: 3/1/2017  © 4931-6530 [employer]. [address] [place] [address]. All rights reserved. This information is not intended as a substitute for professional medical care. Always follow your healthcare professional's instructions.        Types of Laparoscopic Hysterectomy  There are several types of  laparoscopic hysterectomy. Depending on your needs, all or part of the uterus may be removed. In some cases, the cervix, ovaries, or fallopian tubes are also removed. Your surgeon will discuss the options with you before surgery.    Total hysterectomy  A total hysterectomy means that the entire uterus is removed. It may be removed through the vagina. Or, it may be removed in pieces through small incisions in the abdomen.    Hysterectomy with removal of ovaries  In this procedure, the uterus, ovaries, and fallopian tubes are removed. The organs may be removed through the vagina or in pieces through small incisions in the abdomen.    Laparoscopic supracervical hysterectomy (LSH)  With this procedure, the top portion of the uterus is removed. The cervix is left in place and may be closed at top. This procedure may be done if the cervix is healthy. The uterus is removed in pieces through small incisions in the abdomen. The ovaries and tubes may be removed during this type of hysterectomy if desired.  Date Last Reviewed: 3/1/2017  © 3409-7040 [employer]. [address] [place] [address]. All rights reserved. This information is not intended as a substitute for professional medical care. Always follow your healthcare professional's instructions.        Understanding Laparoscopic Hysterectomy  Having your uterus (womb) removed is called a hysterectomy. Using a technique called laparoscopy has many benefits. You may spend less time in the hospital and recover faster.  What is hysterectomy?  Hysterectomy removes the uterus. Part or all of the uterus may be taken out. Certain other organs may be removed at the same time. Having your uterus removed means that you wont be able to get pregnant in the future.  What is laparoscopy?  Laparoscopy is a type of surgery. A long, lighted tube with a camera is used. This is called a laparoscope. The scope sends pictures of the inside of the body to a video  screen. For the surgery, a few small incisions are made in the abdomen. The scope is inserted through one of the small incisions. Surgical tools are inserted through the other incisions to complete the procedure.  Benefits of laparoscopy  This procedure lets you avoid open surgery. Open surgery requires a larger incision in the abdomen. Compared to open surgery laparoscopy may:  · Require less time in the hospital or surgery center  · Offer a faster recovery  · Cause less internal scarring and smaller visible scars  · Cause less pain after surgery  · Have a lower risk of complications  Risks and possible complications of laparoscopic hysterectomy  · Side effects from anesthesia  · Infection  · Bleeding, with a possible need for a transfusion  · Blood clots  · Damage to the bladder, bowel, ureters, or nearby nerves  · Hernia  · Formation of scar tissue that can cause pain or bowel obstruction often times years later  · Need for a second surgery  Date Last Reviewed: 3/1/2017  © 0472-6826 [employer]. [address] [place] [address]. All rights reserved. This information is not intended as a substitute for professional medical care. Always follow your healthcare professional's instructions.        Problems Laparoscopic Hysterectomy Can Treat  Some health problems can cause pain or bleeding in the uterus. These problems can sometimes be helped by medicine. Or surgery may be done that keeps the uterus intact. But sometimes, the best way to relieve pain and bleeding is to remove the uterus using laparoscopic hysterectomy. Its done using small incisions.       The following are conditions hysterectomy can treat:  · Fibroids. A fibroid is a lump of muscle tissue. It grows in or on the wall of the uterus. It is not cancer. A fibroid can cause pain and heavy bleeding. It may press on the bladder or rectum. This can lead to frequent urination, constipation, and other problems.  · Endometriosis.  Endometriosis is the growth of the uterine lining outside of the uterus. This tissue can lead to scarring (adhesions). This scarring occurs inside the pelvic cavity. This can cause severe pain.  · Gynecological cancer. Ovarian, fallopian tube, uterine and cervical cancers can be treated. Many times additional tissues are removed when cancer is involved.  · Endometrial hyperplasia. This is when the endometrium has abnormal cell changes that can precede cancer of the endometrium.  · Cervical dysplasia. This is a change in the cervix that precedes certical cancer.  · Uterine prolapse. This is when the uterus drops from the normal location  Other Problems  Abnormal bleeding may be due to other causes. These include:  · Adenomyosis. This is the growth of the endometrium into the uterine muscle.  · Uterine polyps. These are fleshy growths of tissue from the uterine wall.  Date Last Reviewed: 3/1/2017  © 1426-7382 [employer]. [address] [place] [id number]. All rights reserved. This information is not intended as a substitute for professional medical care. Always follow your healthcare professional's instructions.

## 2020-02-20 NOTE — PROGRESS NOTES
Subjective:       Patient ID: Sophy Fajardo is a 64 y.o. female.    Chief Complaint:  Consult (Surgery)      History of Present Illness  HPI  here for follow up --here with  and son juma    Last had bleeding 10/2019; postmenopausal  sono repUterus: Visualized  Uterus details: The uterus appears mottled in appearance. Fibroids visualized.  Uterus position: anteverted  Endometrium: appears hyperechoic  Uterus long 62 mm  Uterus ap 48 mm  Uterus tr 54 mm  Uterus Vol 85.0 cmÂ³  Endometrial thickness, total 7.9 mm  Fibroids: Fibroids identified  Uterine fibroid D1 38 mm  Uterine fibroid D2 31 mm  Uterine fibroid mean 34.8 mm  Uterine fibroid vol 19.705 cmÂ³  Uterine fibroid D1 19 mm  Uterine fibroid D2 19 mm  Uterine fibroid mean 19.2 mm  Uterine fibroid vol 3.675 cmÂ³  Uterine fibroid D1 13 mm  Uterine fibroid D2 15 mm  Uterine fibroid mean 14.2 mm  Uterine fibroid vol 1.373 cmÂ³ort reviewed    Had emb 3/2019--superficial endometrium, without hyperplasia    GYN & OB History  Patient's last menstrual period was 2008 (approximate).   Date of Last Pap: 2018    OB History    Para Term  AB Living   6 5     1 5   SAB TAB Ectopic Multiple Live Births   1       5      # Outcome Date GA Lbr Martin/2nd Weight Sex Delivery Anes PTL Lv   6 SAB            5 Para      Vag-Spont   ALIX   4 Para      Vag-Spont   ALIX   3 Para      Vag-Spont   ALIX   2 Para      Vag-Spont   ALIX   1 Para      Vag-Spont   ALIX       Review of Systems  Review of Systems   Genitourinary: Positive for postmenopausal bleeding.   All other systems reviewed and are negative.          Objective:      Physical Exam:   Constitutional: She appears well-developed.     Eyes: Pupils are equal, round, and reactive to light. Conjunctivae and EOM are normal.    Neck: Normal range of motion. Neck supple.     Pulmonary/Chest: Effort normal.        Abdominal: Soft.             Musculoskeletal: Normal range of motion.       Neurological: She is  alert.    Skin: Skin is warm.    Psychiatric: She has a normal mood and affect.           Assessment:     Encounter Diagnosis   Name Primary?    Postmenopausal bleeding Yes                 Plan:      Reviewed options  Repeat office emb, hysteroscopy with myosure vs hysterectomy  Pamphlets given on myosure and hysterectomy  Pt aware myosure will allow more thourough sampling of endometrial lining; hysterectomy will alleviate bleeding  Pt and  understand and plan to make a decision

## 2020-02-26 ENCOUNTER — TELEPHONE (OUTPATIENT)
Dept: OBSTETRICS AND GYNECOLOGY | Facility: CLINIC | Age: 65
End: 2020-02-26

## 2020-02-26 NOTE — TELEPHONE ENCOUNTER
Please advise colonoscopy report received --    Polyp removed 2017; dr elsie maradiaga advised repeat colonoscopy in 5 yrs    (due 2022)

## 2021-04-29 ENCOUNTER — PATIENT MESSAGE (OUTPATIENT)
Dept: RESEARCH | Facility: HOSPITAL | Age: 66
End: 2021-04-29

## 2023-04-06 ENCOUNTER — PATIENT MESSAGE (OUTPATIENT)
Dept: RESEARCH | Facility: HOSPITAL | Age: 68
End: 2023-04-06
Payer: COMMERCIAL

## 2023-05-31 ENCOUNTER — PATIENT MESSAGE (OUTPATIENT)
Dept: RESEARCH | Facility: HOSPITAL | Age: 68
End: 2023-05-31
Payer: MEDICARE

## 2023-06-03 DIAGNOSIS — N60.12 FIBROCYSTIC DISEASE OF LEFT BREAST: Primary | ICD-10-CM

## 2023-06-03 NOTE — ASSESSMENT & PLAN NOTE
We reviewed our findings today and her questions were answered.  She understands that her imaging and exams have remained stable (and show nothing concerning).  She is comfortable being followed in a conservative fashion.      She understands the importance of monthly self-breast examination and knows to report any and all changes as they occur.    We discussed our fibrocystic mastopathy protocol in detail. She knows that if she follows this protocol - that her symptoms should improve.  We discussed how breast pain is usually not associated with breast cancer, however, pain can be the presenting symptom with some cancers (but this could be coincidental). Still, if her pain does not improve in 8-12 weeks she should call us back for additional recommendations.    NOTE:::We viewed her films together at today's visit.  We discussed the multiple views obtained and the important findings.  Even benign changes were mentioned and her questions were answered.  She knows that she may receive a formal letter or report from the Radiologist.  She is to contact us if she has questions.

## 2023-06-03 NOTE — PROGRESS NOTES
[facility]  Jeremias Goldsmith MD, FACS  Melissa Galindo NP-C      Chief Complaint:   Sophy Fajardo is a 67 y.o. female presenting today for her annual evaluation.  She is due for mammogram. She reports no interval changes.     History of Present Illness:   Mrs. Fajardo was first seen in November 2007 with a left breast cyst.  Her exams and imaging have remained stable. MD::: David Gunn MD    Past Medical History:   Diagnosis Date    Dry eye     bilateral    Fibrocystic disease of left breast 6/3/2023    Hypertension     Thyroid disease       Past Surgical History:   Procedure Laterality Date    TUBAL LIGATION  19990        Current Outpatient Medications:     atorvastatin (LIPITOR) 20 MG tablet, atorvastatin 20 mg tablet, Disp: , Rfl:     levothyroxine (SYNTHROID) 125 MCG tablet, levothyroxine 125 mcg tablet  Take 1 tablet every day by oral route., Disp: , Rfl:     metoprolol tartrate (LOPRESSOR) 50 MG tablet, metoprolol tartrate 50 mg tablet, Disp: , Rfl:     RESTASIS 0.05 % ophthalmic emulsion, , Disp: , Rfl:    Review of patient's allergies indicates:  No Known Allergies   Social History     Tobacco Use    Smoking status: Never    Smokeless tobacco: Never   Substance Use Topics    Alcohol use: No      History reviewed. No pertinent family history.     Review of Systems   Integumentary:  Negative for color change, rash, mole/lesion, breast mass, breast discharge and breast tenderness.   Breast: Negative for mass and tenderness     Physical Exam   HENT:   Head: Normocephalic.   Pulmonary/Chest: Right breast exhibits no inverted nipple, no mass, no nipple discharge, no skin change and no tenderness. Left breast exhibits no inverted nipple, no mass, no nipple discharge, no skin change and no tenderness. No breast swelling.   Genitourinary: No breast swelling.   Musculoskeletal: Lymphadenopathy:      Upper Body:      Right upper body: No supraclavicular or axillary  adenopathy.      Left upper body: No supraclavicular or axillary adenopathy.     Neurological: She is alert.      MAMMOGRAM REPORT: She has some diffuse fibronodular tissue bilaterally; there are no spiculated lesions, distortions or suspicious calcifications noted; NEM    ASSESSMENT and PLAN OF CARE     1. Fibrocystic disease of left breast  Assessment & Plan:  We reviewed our findings today and her questions were answered.  She understands that her imaging and exams have remained stable (and show nothing concerning).  She is comfortable being followed in a conservative fashion.      She understands the importance of monthly self-breast examination and knows to report any and all changes as they occur.    We discussed our fibrocystic mastopathy protocol in detail. She knows that if she follows this protocol - that her symptoms should improve.  We discussed how breast pain is usually not associated with breast cancer, however, pain can be the presenting symptom with some cancers (but this could be coincidental). Still, if her pain does not improve in 8-12 weeks she should call us back for additional recommendations.    NOTE:::We viewed her films together at today's visit.  We discussed the multiple views obtained and the important findings.  Even benign changes were mentioned and her questions were answered.  She knows that she may receive a formal letter or report from the Radiologist.  She is to contact us if she has questions.         Medical Decision Making:  It is my impression that this patient suffers all conditions contained in this medical document.  Each of these conditions did affect our plan of care and my medical decision making today.  It is my opinion that the medical decision making concerning this patient was of minimal  difficulty based on the aforementioned conditions.  Any further recommendations will be communicated to the patient by me.  I have reviewed and verified her allergies, list of  medications, medical and surgical histories, social history, and a pertinent review of symptoms.     Follow up: 1 year and PRN    For: PE and MGM (D) at

## 2023-06-14 ENCOUNTER — OFFICE VISIT (OUTPATIENT)
Dept: SURGERY | Facility: CLINIC | Age: 68
End: 2023-06-14
Payer: MEDICARE

## 2023-06-14 DIAGNOSIS — N60.12 FIBROCYSTIC DISEASE OF LEFT BREAST: ICD-10-CM

## 2023-06-14 PROCEDURE — 1159F MED LIST DOCD IN RCRD: CPT | Mod: CPTII,S$GLB,, | Performed by: SPECIALIST

## 2023-06-14 PROCEDURE — 1159F PR MEDICATION LIST DOCUMENTED IN MEDICAL RECORD: ICD-10-PCS | Mod: CPTII,S$GLB,, | Performed by: SPECIALIST

## 2023-06-14 PROCEDURE — 99213 PR OFFICE/OUTPT VISIT, EST, LEVL III, 20-29 MIN: ICD-10-PCS | Mod: S$GLB,,, | Performed by: SPECIALIST

## 2023-06-14 PROCEDURE — 99213 OFFICE O/P EST LOW 20 MIN: CPT | Mod: S$GLB,,, | Performed by: SPECIALIST

## 2023-06-14 PROCEDURE — 1160F PR REVIEW ALL MEDS BY PRESCRIBER/CLIN PHARMACIST DOCUMENTED: ICD-10-PCS | Mod: CPTII,S$GLB,, | Performed by: SPECIALIST

## 2023-06-14 PROCEDURE — 1160F RVW MEDS BY RX/DR IN RCRD: CPT | Mod: CPTII,S$GLB,, | Performed by: SPECIALIST

## 2023-06-14 RX ORDER — ATORVASTATIN CALCIUM 20 MG/1
TABLET, FILM COATED ORAL
COMMUNITY

## 2024-06-17 DIAGNOSIS — N60.12 FIBROCYSTIC DISEASE OF LEFT BREAST: Primary | ICD-10-CM

## 2024-06-17 NOTE — PROGRESS NOTES
Date of Service: 7/8/2024      Chief Complaint:   Sophy Fajardo is a 68 y.o. female presenting today for her annual evaluation.  She is due for a mammogram. She reports no interval changes.     History of Present Illness: Mrs. Fajardo was first seen in November 2007 with a left breast cyst.  Her exams and imaging have remained stable. MD::: David Gunn MD    Past Medical History:   Diagnosis Date    Dry eye     bilateral    Fibrocystic disease of left breast 6/3/2023    Hypertension     Thyroid disease       Past Surgical History:   Procedure Laterality Date    TUBAL LIGATION  19990        Current Outpatient Medications:     atorvastatin (LIPITOR) 20 MG tablet, atorvastatin 20 mg tablet, Disp: , Rfl:     hydroCHLOROthiazide (HYDRODIURIL) 25 MG tablet, Take 25 mg by mouth once daily., Disp: , Rfl:     levothyroxine (SYNTHROID) 125 MCG tablet, levothyroxine 125 mcg tablet  Take 1 tablet every day by oral route., Disp: , Rfl:     losartan (COZAAR) 50 MG tablet, Take 1 tablet by mouth once daily., Disp: , Rfl:     metoprolol tartrate (LOPRESSOR) 50 MG tablet, metoprolol tartrate 50 mg tablet, Disp: , Rfl:     RESTASIS 0.05 % ophthalmic emulsion, , Disp: , Rfl:    Review of patient's allergies indicates:  No Known Allergies   Social History     Tobacco Use    Smoking status: Never    Smokeless tobacco: Never   Substance Use Topics    Alcohol use: No      No family history on file.     Review of Systems   Integumentary:  Negative for color change, rash, mole/lesion, thickening/swelling, dimpling of skin, drainage  Breast: Negative for mass and tenderness     Physical Exam   Constitutional: She appears well-developed. She is cooperative.   HENT: Normocephalic.   Cardiovascular:  Normal rate and regular rhythm.            Pulmonary/Chest: She exhibits no tenderness and no bony tenderness.   Abdominal: Soft. Normal appearance.   Musculoskeletal: Intact with no deficits  Neurological: She is alert.   Skin: No  rash noted.   Breast and Chest Wall Evaluation:   Right breast exhibits no mass, no nipple discharge, no skin change and no tenderness.     Left breast exhibits no mass, no nipple discharge, no skin change and no tenderness.     Lymphadenopathy: No supraclavicular or axillary adenopathy.    MAMMOGRAM REPORT: She has some diffuse fibronodular tissue; there are no spiculated lesions, distortions or suspicious calcifications noted; NEM    ASSESSMENT and PLAN OF CARE     1. Fibrocystic disease of left breast  Assessment & Plan:  We discussed our fibrocystic mastopathy protocol in detail. She knows that if she follows this protocol - that her symptoms should improve.  We discussed how breast pain is usually not associated with breast cancer, however, pain can be the presenting symptom with some cancers (but this could be coincidental). Still, if her pain does not improve in 8-12 weeks she should call us back for additional recommendations.    We reviewed our findings today and her questions were answered.  She understands that her imaging and exams have remained stable (and show nothing concerning).  She is comfortable being followed in a conservative fashion.      She understands the importance of monthly self-breast examination and knows to report any and all changes as they occur.    NOTE:::We viewed her films together at today's visit.  We discussed the multiple views obtained and the important findings.  Even benign changes were mentioned and her questions were answered.  She knows that she may receive a formal letter or report from the Radiologist.  She is to contact us if she has questions.         Medical Decision Making: It is my impression that this patient suffers all conditions contained in this medical document.  Each of these conditions did affect our plan of care and my medical decision making today.  It is my opinion that the medical decision making concerning this patient was of minimal  difficulty based  on the aforementioned conditions.  Any further recommendations will be communicated to the patient by me.  I have reviewed and verified her allergies, list of medications, medical and surgical histories, social history, and a pertinent review of symptoms.     Follow up: 1 year and PRN    For: Physical Examination and MGM (D) at

## 2024-06-18 NOTE — ASSESSMENT & PLAN NOTE
We discussed our fibrocystic mastopathy protocol in detail. She knows that if she follows this protocol - that her symptoms should improve.  We discussed how breast pain is usually not associated with breast cancer, however, pain can be the presenting symptom with some cancers (but this could be coincidental). Still, if her pain does not improve in 8-12 weeks she should call us back for additional recommendations.    We reviewed our findings today and her questions were answered.  She understands that her imaging and exams have remained stable (and show nothing concerning).  She is comfortable being followed in a conservative fashion.      She understands the importance of monthly self-breast examination and knows to report any and all changes as they occur.    NOTE:::We viewed her films together at today's visit.  We discussed the multiple views obtained and the important findings.  Even benign changes were mentioned and her questions were answered.  She knows that she may receive a formal letter or report from the Radiologist.  She is to contact us if she has questions.

## 2024-07-08 ENCOUNTER — OFFICE VISIT (OUTPATIENT)
Dept: SURGERY | Facility: CLINIC | Age: 69
End: 2024-07-08
Payer: MEDICARE

## 2024-07-08 DIAGNOSIS — N60.12 FIBROCYSTIC DISEASE OF LEFT BREAST: Primary | ICD-10-CM

## 2024-07-08 PROCEDURE — 99213 OFFICE O/P EST LOW 20 MIN: CPT | Mod: S$GLB,,, | Performed by: SPECIALIST

## 2024-07-08 PROCEDURE — 1126F AMNT PAIN NOTED NONE PRSNT: CPT | Mod: CPTII,S$GLB,, | Performed by: SPECIALIST

## 2024-07-08 PROCEDURE — 99999 PR PBB SHADOW E&M-EST. PATIENT-LVL II: CPT | Mod: PBBFAC,,, | Performed by: SPECIALIST

## 2024-07-08 PROCEDURE — 4010F ACE/ARB THERAPY RXD/TAKEN: CPT | Mod: CPTII,S$GLB,, | Performed by: SPECIALIST

## 2024-07-08 PROCEDURE — 1160F RVW MEDS BY RX/DR IN RCRD: CPT | Mod: CPTII,S$GLB,, | Performed by: SPECIALIST

## 2024-07-08 PROCEDURE — 1159F MED LIST DOCD IN RCRD: CPT | Mod: CPTII,S$GLB,, | Performed by: SPECIALIST

## 2024-07-08 RX ORDER — LOSARTAN POTASSIUM 50 MG/1
1 TABLET ORAL DAILY
COMMUNITY

## 2024-07-08 RX ORDER — HYDROCHLOROTHIAZIDE 25 MG/1
25 TABLET ORAL DAILY
COMMUNITY

## 2025-04-22 ENCOUNTER — TELEPHONE (OUTPATIENT)
Dept: SURGICAL ONCOLOGY | Facility: CLINIC | Age: 70
End: 2025-04-22
Payer: MEDICARE

## 2025-04-22 NOTE — TELEPHONE ENCOUNTER
Left vm regarding 7/9/25 appt w/ Dr Aron Goldsmith. Informed that MD has retired, and this appt will be cancelled. Left number to call office for scheduling.

## 2025-04-23 NOTE — TELEPHONE ENCOUNTER
Attempted to contact patient on home number listed. No answer. Phone line continued to ring. No vm prompt to leave msg. This call was in regards to 7/9/25 appt w/ Dr Aron Goldsmith. MD has retired. This appt will be cancelled.

## 2025-07-10 ENCOUNTER — TELEPHONE (OUTPATIENT)
Dept: SURGERY | Facility: CLINIC | Age: 70
End: 2025-07-10
Payer: MEDICARE

## 2025-07-10 NOTE — PROGRESS NOTES
History & Physical    Subjective     History of Present Illness:  Patient is a 69 y.o. female presenting today for her annual evaluation and CBE. She reports no interval changes. First seen by breast surgery in 2007 with a left breast cyst. Her exams and imaging have remained stable. Last seen by breast surgery 2024.    She denies any breast concerns today. She denies any breast masses, breast pain, skin changes, nipple inversion, nipple discharge, axillary masses. She denies any new family history of breast or ovarian cancer. She is not taking any hormonal medications. She is feeling well today. She underwent mammogram before her appointment today - results pending. 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      Her breast cancer risk factors are as follows:  Menarche: 13  Menopause: 51  HRT: none  Pregnancies:    Age at first live birth: 24yo  Breastfeeding: yes  Smoking: denies  ETOH: denies  Previous breast surgery or breast biopsy: denies  Previous radiation to neck or chest wall: denies  Family history: maternal cousin breast cancer      Review of patient's allergies indicates:  No Known Allergies    Current Medications[1]    Past Medical History:   Diagnosis Date    Dry eye     bilateral    Fibrocystic disease of left breast 6/3/2023    Hypertension     Thyroid disease      Past Surgical History:   Procedure Laterality Date    TUBAL LIGATION       Family History   Problem Relation Name Age of Onset    No Known Problems Mother      No Known Problems Father      No Known Problems Sister      No Known Problems Daughter      No Known Problems Maternal Aunt      No Known Problems Maternal Uncle      No Known Problems Paternal Aunt      No Known Problems Paternal Uncle      No Known Problems Maternal Grandmother      No Known Problems Maternal Grandfather      No Known Problems Paternal Grandmother      No Known Problems Paternal Grandfather      No Known Problems Other       Social  "History[2]     Review of Systems:  Review of Systems   Constitutional:  Negative for activity change, appetite change, chills, fatigue, fever and unexpected weight change.   HENT:  Negative for congestion, ear pain, sore throat and trouble swallowing.    Eyes:  Negative for pain, redness and itching.   Respiratory:  Negative for cough, shortness of breath and wheezing.    Cardiovascular:  Negative for chest pain, palpitations and leg swelling.   Endocrine: Negative for cold intolerance, heat intolerance and polyuria.   Genitourinary:  Negative for dysuria, flank pain, frequency and hematuria.   Musculoskeletal:  Negative for gait problem, joint swelling and neck pain.   Skin:  Negative for color change, rash and wound.   Allergic/Immunologic: Negative for environmental allergies and immunocompromised state.   Neurological:  Negative for dizziness, speech difficulty, weakness and numbness.   Psychiatric/Behavioral:  Negative for agitation, confusion and hallucinations.    Breast: Pt denies any breast pain, breast masses, nipple inversion, nipple discharge, skin changes, or axillary masses       Objective     Vital Signs (Most Recent)  Resp: 16 (07/15/25 1416)  5' 2" (1.575 m)  46.9 kg (103 lb 6.3 oz)     Physical Exam:  Physical Exam  Vitals reviewed. Exam conducted with a chaperone present.   Constitutional:       General: She is not in acute distress.     Appearance: Normal appearance. She is well-developed and normal weight. She is not ill-appearing or toxic-appearing.   HENT:      Head: Normocephalic and atraumatic.      Right Ear: External ear normal.      Left Ear: External ear normal.      Nose: Nose normal.   Cardiovascular:      Rate and Rhythm: Normal rate.   Pulmonary:      Effort: Pulmonary effort is normal. No respiratory distress.   Chest:   Breasts:     Right: No swelling, bleeding, inverted nipple, mass, nipple discharge, skin change or tenderness.      Left: No swelling, bleeding, inverted nipple, " mass, nipple discharge, skin change or tenderness.   Musculoskeletal:         General: Normal range of motion.      Cervical back: Normal range of motion and neck supple.   Lymphadenopathy:      Head:      Right side of head: No submental, submandibular or occipital adenopathy.      Left side of head: No submental, submandibular or occipital adenopathy.      Cervical: No cervical adenopathy.      Upper Body:      Right upper body: No supraclavicular or axillary adenopathy.      Left upper body: No supraclavicular or axillary adenopathy.   Skin:     General: Skin is warm and dry.   Neurological:      Mental Status: She is alert and oriented to person, place, and time.   Psychiatric:         Mood and Affect: Mood normal.         Behavior: Behavior normal.         Diagnostic Results:    Mammo Digital Screening Bilat w/ Jamie ([facility]) - 07/2025  Results pending today, will f/u       MAMMO DIGITAL DIAGNOSTIC BILAT WITH JAMIE - 07/2024    FINDINGS:  The breast tissue is heterogeneously dense, which lowers the sensitivity of mammography. Spot compression and tomosynthesis views of the left breast were performed demonstrating no underlying mass or architectural distortion. There are no suspicious masses or suspicious calcifications seen to suggest malignancy. Benign-type calcifications are identified.     Impression    No evidence of malignancy. No significant change when compared to previous exam. Follow-up mammography is recommended in one year.    BIRADS 2: BENIGN    BREAST TISSUE DENSITY C: HETEROGENEOUSLY DENSE    RECOMMENDATION: Routine Screening Mammogram in 1 Year       Assessment and Plan     69 y.o. F with L breast cyst that has remained stable on exam & imaging     Bilateral screening mammogram today. Results pending. Will f/u. Will need repeat mammogram in 1 year.     Patient denies breast pain. We discussed our Fibrocystic Mastopathy Protocol in detail if she were to develop breast pain. She should take Vitamin E  800 IU everyday x 3 months or until non-tender then can stop Vitamin E vs. continue daily at 400 IU. The use of ice packs or warms soaks to tender area of the breast may also be of some benefit.  If warm soaks help her tenderness - She can use Aspercreme (unless allergic to Aspirin) on the affected area.  Ibuprofen (if no contraindications) at 800 mg three times per day for 5 days can also relieve many symptoms associated with swollen or inflamed tissue.  She can repeat Ibuprofen for 5 days, but then should be off for 5 days as it may cause gastric upset.  It is a good idea to wear a tight bra during the day and night to minimize movement of the tender area (Sports Bras work well).  Evening Primrose Oil can be bought over the counter and used at a dose of 3000 mg per day to help with any breast pain/tenderness not improved by implementing the above measures.    We reviewed our findings today and her questions were answered.  She understands that her imaging and exams have remained stable (and show nothing concerning).  She is comfortable being followed in a conservative fashion. She understands the importance of monthly self-breast examination and knows to report any and all changes as they occur.    Sophy has a normal breast exam today. We discussed the possible signs and symptoms of breast cancer as lump, masses, new asymmetries, skin changes and nipple changes. She is encouraged to contact me if any new breast concerns arise.       Dispo: RTC 1 year for CBE + Screening bilateral mammogram       Pooja Srinivasan PA-C  [facility]     I spent a total of 30 minutes on the day of the visit.  This includes face to face time and non-face to face time preparing to see the patient (eg, review of tests), obtaining and/or reviewing separately obtained history, documenting clinical information in the electronic or other health record, independently interpreting results and communicating results to the  patient/family/caregiver, or care coordinator.         [1]   Current Outpatient Medications   Medication Sig Dispense Refill    levothyroxine (SYNTHROID) 125 MCG tablet levothyroxine 125 mcg tablet   Take 1 tablet every day by oral route. (Patient taking differently: 50 mcg. Pt states that she is taking 50 MCG)      losartan (COZAAR) 50 MG tablet Take 1 tablet by mouth once daily. (Patient taking differently: Take 100 mg by mouth once daily. Pt states that she is taking 100 MG of this medication.)      atorvastatin (LIPITOR) 20 MG tablet atorvastatin 20 mg tablet      hydroCHLOROthiazide (HYDRODIURIL) 25 MG tablet Take 25 mg by mouth once daily.      metoprolol tartrate (LOPRESSOR) 50 MG tablet metoprolol tartrate 50 mg tablet      RESTASIS 0.05 % ophthalmic emulsion        No current facility-administered medications for this visit.   [2]   Social History  Tobacco Use    Smoking status: Never    Smokeless tobacco: Never   Substance Use Topics    Alcohol use: No    Drug use: No

## 2025-07-10 NOTE — TELEPHONE ENCOUNTER
Returned phone call to patient. Patient requested to reschedule today's appts for mammo/cbe. Appts rescheduled as requested.

## 2025-07-15 ENCOUNTER — HOSPITAL ENCOUNTER (OUTPATIENT)
Dept: RADIOLOGY | Facility: HOSPITAL | Age: 70
Discharge: HOME | End: 2025-07-15
Attending: FAMILY MEDICINE
Payer: MEDICARE

## 2025-07-15 ENCOUNTER — OFFICE VISIT (OUTPATIENT)
Dept: SURGERY | Facility: CLINIC | Age: 70
End: 2025-07-15
Payer: MEDICARE

## 2025-07-15 VITALS
WEIGHT: 110.25 LBS | HEIGHT: 62 IN | BODY MASS INDEX: 19.02 KG/M2 | BODY MASS INDEX: 20.29 KG/M2 | HEIGHT: 62 IN | WEIGHT: 103.38 LBS | RESPIRATION RATE: 16 BRPM

## 2025-07-15 DIAGNOSIS — Z71.89 COUNSELING AND COORDINATION OF CARE: ICD-10-CM

## 2025-07-15 DIAGNOSIS — N60.12 FIBROCYSTIC DISEASE OF LEFT BREAST: Primary | ICD-10-CM

## 2025-07-15 DIAGNOSIS — Z71.89 COUNSELING ON HEALTH PROMOTION AND DISEASE PREVENTION: ICD-10-CM

## 2025-07-15 DIAGNOSIS — Z12.31 ENCOUNTER FOR SCREENING MAMMOGRAM FOR MALIGNANT NEOPLASM OF BREAST: ICD-10-CM

## 2025-07-15 DIAGNOSIS — Z12.31 ENCOUNTER FOR SCREENING MAMMOGRAM FOR BREAST CANCER: ICD-10-CM

## 2025-07-15 DIAGNOSIS — Z12.39 ENCOUNTER FOR SCREENING BREAST EXAMINATION: ICD-10-CM

## 2025-07-15 PROCEDURE — 1126F AMNT PAIN NOTED NONE PRSNT: CPT | Mod: CPTII,S$GLB,,

## 2025-07-15 PROCEDURE — 4010F ACE/ARB THERAPY RXD/TAKEN: CPT | Mod: CPTII,S$GLB,,

## 2025-07-15 PROCEDURE — 1101F PT FALLS ASSESS-DOCD LE1/YR: CPT | Mod: CPTII,S$GLB,,

## 2025-07-15 PROCEDURE — 3288F FALL RISK ASSESSMENT DOCD: CPT | Mod: CPTII,S$GLB,,

## 2025-07-15 PROCEDURE — 99214 OFFICE O/P EST MOD 30 MIN: CPT | Mod: S$GLB,,,

## 2025-07-15 PROCEDURE — 77067 SCR MAMMO BI INCL CAD: CPT | Mod: TC

## 2025-07-15 PROCEDURE — 3008F BODY MASS INDEX DOCD: CPT | Mod: CPTII,S$GLB,,

## 2025-07-15 PROCEDURE — 77067 SCR MAMMO BI INCL CAD: CPT | Mod: 26,,, | Performed by: RADIOLOGY

## 2025-07-15 PROCEDURE — 77063 BREAST TOMOSYNTHESIS BI: CPT | Mod: 26,,, | Performed by: RADIOLOGY

## 2025-07-15 PROCEDURE — 99999 PR PBB SHADOW E&M-EST. PATIENT-LVL II: CPT | Mod: PBBFAC,,,
